# Patient Record
Sex: MALE | Race: BLACK OR AFRICAN AMERICAN | Employment: FULL TIME | URBAN - METROPOLITAN AREA
[De-identification: names, ages, dates, MRNs, and addresses within clinical notes are randomized per-mention and may not be internally consistent; named-entity substitution may affect disease eponyms.]

---

## 2021-01-06 ENCOUNTER — APPOINTMENT (OUTPATIENT)
Dept: CT IMAGING | Age: 56
End: 2021-01-06
Attending: EMERGENCY MEDICINE
Payer: OTHER GOVERNMENT

## 2021-01-06 ENCOUNTER — APPOINTMENT (OUTPATIENT)
Dept: GENERAL RADIOLOGY | Age: 56
End: 2021-01-06
Attending: EMERGENCY MEDICINE
Payer: OTHER GOVERNMENT

## 2021-01-06 ENCOUNTER — HOSPITAL ENCOUNTER (EMERGENCY)
Age: 56
Discharge: HOME | End: 2021-01-06
Attending: EMERGENCY MEDICINE
Payer: OTHER GOVERNMENT

## 2021-01-06 VITALS
SYSTOLIC BLOOD PRESSURE: 115 MMHG | OXYGEN SATURATION: 95 % | WEIGHT: 300 LBS | TEMPERATURE: 98.2 F | HEART RATE: 90 BPM | DIASTOLIC BLOOD PRESSURE: 61 MMHG | RESPIRATION RATE: 22 BRPM | BODY MASS INDEX: 37.5 KG/M2

## 2021-01-06 DIAGNOSIS — R42 DIZZINESS: ICD-10-CM

## 2021-01-06 DIAGNOSIS — I48.91 ATRIAL FIBRILLATION, UNSPECIFIED TYPE (HCC): Primary | ICD-10-CM

## 2021-01-06 DIAGNOSIS — R42 VERTIGO: ICD-10-CM

## 2021-01-06 LAB
ALBUMIN SERPL-MCNC: 4 G/DL (ref 3.4–5)
ALBUMIN/GLOB SERPL: 1.2 {RATIO} (ref 0.8–1.7)
ALP SERPL-CCNC: 88 U/L (ref 45–117)
ALT SERPL-CCNC: 31 U/L (ref 16–61)
ANION GAP SERPL CALC-SCNC: 8 MMOL/L (ref 3–18)
AST SERPL-CCNC: 15 U/L (ref 10–38)
ATRIAL RATE: 101 BPM
BASOPHILS # BLD: 0.1 K/UL (ref 0–0.1)
BASOPHILS NFR BLD: 1 % (ref 0–2)
BILIRUB SERPL-MCNC: 0.5 MG/DL (ref 0.2–1)
BUN SERPL-MCNC: 19 MG/DL (ref 7–18)
BUN/CREAT SERPL: 11 (ref 12–20)
CALCIUM SERPL-MCNC: 9.9 MG/DL (ref 8.5–10.1)
CALCULATED R AXIS, ECG10: -33 DEGREES
CALCULATED T AXIS, ECG11: 35 DEGREES
CHLORIDE SERPL-SCNC: 104 MMOL/L (ref 100–111)
CK MB CFR SERPL CALC: 1 % (ref 0–4)
CK MB SERPL-MCNC: 1.9 NG/ML (ref 5–25)
CK SERPL-CCNC: 189 U/L (ref 39–308)
CO2 SERPL-SCNC: 28 MMOL/L (ref 21–32)
CREAT SERPL-MCNC: 1.69 MG/DL (ref 0.6–1.3)
DIAGNOSIS, 93000: NORMAL
DIFFERENTIAL METHOD BLD: ABNORMAL
EOSINOPHIL # BLD: 0.2 K/UL (ref 0–0.4)
EOSINOPHIL NFR BLD: 2 % (ref 0–5)
ERYTHROCYTE [DISTWIDTH] IN BLOOD BY AUTOMATED COUNT: 13 % (ref 11.6–14.5)
GLOBULIN SER CALC-MCNC: 3.3 G/DL (ref 2–4)
GLUCOSE SERPL-MCNC: 278 MG/DL (ref 74–99)
HCT VFR BLD AUTO: 50.5 % (ref 36–48)
HGB BLD-MCNC: 17.2 G/DL (ref 13–16)
LYMPHOCYTES # BLD: 2.5 K/UL (ref 0.9–3.6)
LYMPHOCYTES NFR BLD: 39 % (ref 21–52)
MCH RBC QN AUTO: 29.2 PG (ref 24–34)
MCHC RBC AUTO-ENTMCNC: 34.1 G/DL (ref 31–37)
MCV RBC AUTO: 85.7 FL (ref 74–97)
MONOCYTES # BLD: 0.5 K/UL (ref 0.05–1.2)
MONOCYTES NFR BLD: 8 % (ref 3–10)
NEUTS SEG # BLD: 3.1 K/UL (ref 1.8–8)
NEUTS SEG NFR BLD: 50 % (ref 40–73)
PLATELET # BLD AUTO: 227 K/UL (ref 135–420)
PMV BLD AUTO: 12.4 FL (ref 9.2–11.8)
POTASSIUM SERPL-SCNC: 3.9 MMOL/L (ref 3.5–5.5)
PROT SERPL-MCNC: 7.3 G/DL (ref 6.4–8.2)
Q-T INTERVAL, ECG07: 336 MS
QRS DURATION, ECG06: 88 MS
QTC CALCULATION (BEZET), ECG08: 435 MS
RBC # BLD AUTO: 5.89 M/UL (ref 4.7–5.5)
SODIUM SERPL-SCNC: 140 MMOL/L (ref 136–145)
TROPONIN I SERPL-MCNC: <0.02 NG/ML (ref 0–0.04)
VENTRICULAR RATE, ECG03: 101 BPM
WBC # BLD AUTO: 6.3 K/UL (ref 4.6–13.2)

## 2021-01-06 PROCEDURE — 82553 CREATINE MB FRACTION: CPT

## 2021-01-06 PROCEDURE — 85025 COMPLETE CBC W/AUTO DIFF WBC: CPT

## 2021-01-06 PROCEDURE — 80053 COMPREHEN METABOLIC PANEL: CPT

## 2021-01-06 PROCEDURE — 99285 EMERGENCY DEPT VISIT HI MDM: CPT

## 2021-01-06 PROCEDURE — 93005 ELECTROCARDIOGRAM TRACING: CPT

## 2021-01-06 PROCEDURE — 70450 CT HEAD/BRAIN W/O DYE: CPT

## 2021-01-06 PROCEDURE — 71045 X-RAY EXAM CHEST 1 VIEW: CPT

## 2021-01-06 RX ORDER — MECLIZINE HYDROCHLORIDE 25 MG/1
TABLET ORAL
Qty: 20 TAB | Refills: 0 | Status: SHIPPED | OUTPATIENT
Start: 2021-01-06

## 2021-01-06 RX ORDER — METOPROLOL SUCCINATE 25 MG/1
25 TABLET, EXTENDED RELEASE ORAL DAILY
Qty: 30 TAB | Refills: 0 | Status: SHIPPED | OUTPATIENT
Start: 2021-01-06

## 2021-01-06 NOTE — ED TRIAGE NOTES
[facility] and felt short of breath, his watch told him he had an irregular heart beat and felt weak.  Says he's also been dizzy for the past week

## 2021-01-06 NOTE — ED PROVIDER NOTES
EMERGENCY DEPARTMENT HISTORY AND PHYSICAL EXAM    11:16 AM      Date: 1/6/2021  Patient Name: Anthony Mills    History of Presenting Illness     Chief Complaint   Patient presents with    Irregular Heart Beat    Dizziness         History Provided By: Patient    Additional History (Context): Anthony Mills is a 54 y.o. male with diabetes, hypertension and renal insufficiency who presents with dizziness. Patient dates he has been feeling dizzy for about a week now. Today he was walking at [employer] and felt short of breath. Patient denies chest pain. Patient denies cough, fever, vomiting or diarrhea. He admits to being nauseated. Patient denies smoking, alcohol or recreational drug use. Denies any changes in appetite. Patient denies any urinary symptoms. Patient denies any Covid exposures. PCP: Nelly Max MD        Current Outpatient Medications   Medication Sig Dispense Refill    metoprolol succinate (Toprol XL) 25 mg XL tablet Take 1 Tab by mouth daily. 30 Tab 0    Insulin Needles, Disposable, 32 gauge x 5/32\" ndle 1 Device.  allopurinol (ZYLOPRIM) 300 mg tablet 300 mg.      amLODIPine (NORVASC) 10 mg tablet TAKE 1 TAB BY MOUTH ONCE A DAY.  glipiZIDE SR (GLUCOTROL) 10 mg CR tablet 10 mg two (2) times a day. Indications: type 2 diabetes mellitus      hydrALAZINE (APRESOLINE) 100 mg tablet 100 mg three (3) times daily. Indications: hypertension      insulin glargine (LANTUS) 100 unit/mL injection 40 Units nightly. Inject  beneath the skin Every Night at Bedtime. Indications: type 2 diabetes mellitus      lisinopril-hydrochlorothiazide (PRINZIDE, ZESTORETIC) 20-12.5 mg per tablet TAKE 1 TAB BY MOUTH TWICE DAILY.  tadalafil (CIALIS, ADCIRCA) 20 mg tablet 20 mg.      VITAMIN D2 50,000 unit capsule 50,000 Units every seven (7) days.   3       Past History     Past Medical History:  Past Medical History:   Diagnosis Date    Chronic kidney disease     Chronic pain     neck    Diabetes ([facility] [address]     Gout     HTN (hypertension)     HTN (hypertension)     Hx of gastric ulcer     Ill-defined condition     vocal cord lesion - hx    Morbid obesity (HCC)     EDIN (obstructive sleep apnea)     Pes planus        Past Surgical History:  Past Surgical History:   Procedure Laterality Date    HX OTHER SURGICAL      gastric bleed; microlaryngoscopy       Family History:  Family History   Problem Relation Age of Onset    Diabetes Mother     Hypertension Mother    Yelitza Curl Arthritis-osteo Mother     Lung Disease Mother     Diabetes Father        Social History:  Social History     Tobacco Use    Smoking status: Former Smoker     Packs/day: 1.50     Years: 3.00     Pack years: 4.50     Quit date: 10/1/1988     Years since quittin.2    Smokeless tobacco: Never Used   Substance Use Topics    Alcohol use: No    Drug use: No       Allergies: Allergies   Allergen Reactions    Aspirin Other (comments)    Ibuprofen Other (comments)    Penicillins Other (comments)    Seafood Other (comments)         Review of Systems       Review of Systems   Constitutional: Negative. Negative for chills, diaphoresis and fever. HENT: Negative. Negative for congestion, rhinorrhea and sore throat. Eyes: Negative. Negative for pain, discharge and redness. Respiratory: Positive for shortness of breath. Negative for cough, chest tightness and wheezing. Cardiovascular: Negative. Negative for chest pain. Gastrointestinal: Negative. Negative for abdominal pain, constipation, diarrhea, nausea and vomiting. Genitourinary: Negative. Negative for dysuria, flank pain, frequency, hematuria and urgency. Musculoskeletal: Negative. Negative for back pain and neck pain. Skin: Negative. Negative for rash. Neurological: Positive for dizziness. Negative for syncope, weakness, numbness and headaches. Psychiatric/Behavioral: Negative. All other systems reviewed and are negative.         Physical Exam Visit Vitals  /61   Pulse 90   Temp 98.2 °F (36.8 °C)   Resp 22   Wt 136.1 kg (300 lb)   SpO2 95%   BMI 37.50 kg/m²         Physical Exam  Vitals signs and nursing note reviewed. Constitutional:       General: He is not in acute distress. Appearance: Normal appearance. He is well-developed. He is not ill-appearing, toxic-appearing or diaphoretic. HENT:      Head: Normocephalic and atraumatic. Mouth/Throat:      Pharynx: No oropharyngeal exudate. Eyes:      General: No scleral icterus. Conjunctiva/sclera: Conjunctivae normal.      Pupils: Pupils are equal, round, and reactive to light. Neck:      Musculoskeletal: Normal range of motion and neck supple. Thyroid: No thyromegaly. Vascular: No hepatojugular reflux or JVD. Trachea: No tracheal deviation. Cardiovascular:      Rate and Rhythm: Normal rate and regular rhythm. Pulses: Normal pulses. Radial pulses are 2+ on the right side and 2+ on the left side. Dorsalis pedis pulses are 2+ on the right side and 2+ on the left side. Heart sounds: Normal heart sounds, S1 normal and S2 normal. No murmur. No gallop. No S3 or S4 sounds. Pulmonary:      Effort: Pulmonary effort is normal. No respiratory distress. Breath sounds: Normal breath sounds. No decreased breath sounds, wheezing, rhonchi or rales. Abdominal:      General: Bowel sounds are normal. There is no distension. Palpations: Abdomen is soft. Abdomen is not rigid. There is no mass. Tenderness: There is no abdominal tenderness. There is no guarding or rebound. Negative signs include Hendrix's sign and McBurney's sign. Musculoskeletal: Normal range of motion. Comments: Strength 5 out of 5 throughout. Lymphadenopathy:      Head:      Right side of head: No submental, submandibular, preauricular or occipital adenopathy. Left side of head: No submental, submandibular, preauricular or occipital adenopathy. Cervical: No cervical adenopathy. Upper Body:      Right upper body: No supraclavicular adenopathy. Left upper body: No supraclavicular adenopathy. Skin:     General: Skin is warm and dry. Findings: No rash. Neurological:      Mental Status: He is alert. He is not disoriented. GCS: GCS eye subscore is 4. GCS verbal subscore is 5. GCS motor subscore is 6. Cranial Nerves: No cranial nerve deficit. Sensory: No sensory deficit. Coordination: Coordination normal.      Gait: Gait normal.      Deep Tendon Reflexes: Reflexes are normal and symmetric. Comments: Grossly intact. Psychiatric:         Speech: Speech normal.         Behavior: Behavior normal.         Thought Content: Thought content normal.         Judgment: Judgment normal.           Diagnostic Study Results     Labs -  Recent Results (from the past 12 hour(s))   CBC WITH AUTOMATED DIFF    Collection Time: 01/06/21 11:06 AM   Result Value Ref Range    WBC 6.3 4.6 - 13.2 K/uL    RBC 5.89 (H) 4.70 - 5.50 M/uL    HGB 17.2 (H) 13.0 - 16.0 g/dL    HCT 50.5 (H) 36.0 - 48.0 %    MCV 85.7 74.0 - 97.0 FL    MCH 29.2 24.0 - 34.0 PG    MCHC 34.1 31.0 - 37.0 g/dL    RDW 13.0 11.6 - 14.5 %    PLATELET 764 907 - 367 K/uL    MPV 12.4 (H) 9.2 - 11.8 FL    NEUTROPHILS 50 40 - 73 %    LYMPHOCYTES 39 21 - 52 %    MONOCYTES 8 3 - 10 %    EOSINOPHILS 2 0 - 5 %    BASOPHILS 1 0 - 2 %    ABS. NEUTROPHILS 3.1 1.8 - 8.0 K/UL    ABS. LYMPHOCYTES 2.5 0.9 - 3.6 K/UL    ABS. MONOCYTES 0.5 0.05 - 1.2 K/UL    ABS. EOSINOPHILS 0.2 0.0 - 0.4 K/UL    ABS.  BASOPHILS 0.1 0.0 - 0.1 K/UL    DF AUTOMATED     METABOLIC PANEL, COMPREHENSIVE    Collection Time: 01/06/21 11:06 AM   Result Value Ref Range    Sodium 140 136 - 145 mmol/L    Potassium 3.9 3.5 - 5.5 mmol/L    Chloride 104 100 - 111 mmol/L    CO2 28 21 - 32 mmol/L    Anion gap 8 3.0 - 18 mmol/L    Glucose 278 (H) 74 - 99 mg/dL    BUN 19 (H) 7.0 - 18 MG/DL    Creatinine 1.69 (H) 0.6 - 1.3 MG/DL BUN/Creatinine ratio 11 (L) 12 - 20      GFR est AA 51 (L) >60 ml/min/1.73m2    GFR est non-AA 42 (L) >60 ml/min/1.73m2    Calcium 9.9 8.5 - 10.1 MG/DL    Bilirubin, total 0.5 0.2 - 1.0 MG/DL    ALT (SGPT) 31 16 - 61 U/L    AST (SGOT) 15 10 - 38 U/L    Alk. phosphatase 88 45 - 117 U/L    Protein, total 7.3 6.4 - 8.2 g/dL    Albumin 4.0 3.4 - 5.0 g/dL    Globulin 3.3 2.0 - 4.0 g/dL    A-G Ratio 1.2 0.8 - 1.7     CARDIAC PANEL,(CK, CKMB & TROPONIN)    Collection Time: 01/06/21 11:06 AM   Result Value Ref Range    CK - MB 1.9 <3.6 ng/ml    CK-MB Index 1.0 0.0 - 4.0 %     39 - 308 U/L    Troponin-I, QT <0.02 0.0 - 0.045 NG/ML   EKG, 12 LEAD, INITIAL    Collection Time: 01/06/21 11:12 AM   Result Value Ref Range    Ventricular Rate 101 BPM    Atrial Rate 101 BPM    QRS Duration 88 ms    Q-T Interval 336 ms    QTC Calculation (Bezet) 435 ms    Calculated R Axis -33 degrees    Calculated T Axis 35 degrees    Diagnosis       Atrial fibrillation with rapid ventricular response with premature   ventricular or aberrantly conducted complexes  Left axis deviation  Abnormal ECG  When compared with ECG of 04-MAY-2016 14:39,  Atrial fibrillation has replaced Sinus rhythm  QRS axis shifted left         Radiologic Studies -   XR CHEST PORT   Final Result   IMPRESSION: No acute cardiopulmonary disease. CT HEAD WO CONT   Final Result   IMPRESSION:      No acute intracranial abnormality. Note: If clinical concern of acute stroke, MRI with diffusion weighted images is   recommended for better evaluation.       Thank you for your referral.            Medical Decision Making   Provider Notes (Medical Decision Making):  MDM  Number of Diagnoses or Management Options  Diagnosis management comments: Shortness of breath etiologies include chronic obstructive pulmonary disease (COPD), acute asthma exacerbation, congestive heart failure, pneumonia, acute bronchitis, pulmonary embolism, upper respiratory infection, cardiac event to include acute coronary syndrome, acute myocardial infarction or a combination of the above (ex URI on top of COPD thus causing respiratory distress). I am the first provider for this patient. I reviewed the vital signs, available nursing notes, past medical history, past surgical history, family history and social history. Vital Signs-Reviewed the patient's vital signs. Records Reviewed: Nursing Notes (Time of Review: 11:16 AM)    ED Course: Progress Notes, Reevaluation, and Consults:    Labs essentially normal.  Chest X-Ray showed No acute process. EKG showed atrial fibrillation at a rate of 101 bpm. With no ST elevations or depression and non specific T wave changes. CT head showed no acute process. 1:16 PM 1/6/2021    Consult:  Discussed care with [facility]. Standard discussion; including history of patients chief complaint, available diagnostic results, and treatment course. Will follow up outpatient. Recommends placing the patient on Toprol and daily baby aspirin  1:27 PM        Diagnosis       I have reassessed the patient. Patient is feeling well. Patient will be prescribed Toprol. Patient was discharged in stable condition. Patient is to return to emergency department if any new or worsening condition. Clinical Impression:   1. Atrial fibrillation, unspecified type ([address]    2.  Dizziness        Disposition: Discharged home     Follow-up Information     Follow up With Specialties Details Why Contact Info    Doug Jean MD Internal Medicine In 2 days  [address] [facility] [place] [address] [facility]      Megan Mena MD Cardiology, Internal Medicine, Nuclear Medicine In 1 day  [place] [address]  [phone]               Attestation        Provider Attestation:     I personally performed the services described in the documentation, reviewed the documentation and it accurately and completely records my words and actions utilizing the [employer] January 06, 2021 at 1:27 PM - June, [address]. It is dictated using utilizing voice recognition software. Unfortunately this leads to occasional typographical errors. I apologize in advance if the situation occurs. If questions arise please do not hesitate to contact me or call our department.

## 2021-01-06 NOTE — ED NOTES
Pt ambulated from triage to main ED.  Pt states that when he ambulates and/or talks too much, he feels short of breath